# Patient Record
(demographics unavailable — no encounter records)

---

## 2024-11-19 NOTE — IMAGING
[de-identified] : The patient is a well appearing 16 year old male of their stated age. Patient ambulates IN PNEUMATIC CAM BOOT Negative straight leg raise.   Effected Foot and Ankle: RIGHT   Inspection: Erythema: None Ecchymosis: None Abrasions: None Effusion: None Deformity: None Pes [place] Valgus: Negative Pes Cavus: Negative   Palpation: Crepitus: None Medial Malleolus: Nontender Lateral Malleolus: Nontender Syndesmosis: TENDER  Proximal Fibula: Nontender ATFL: TENDER  CFL: Nontender Deltoid: TENDER MILDLY POSTERIORALLY Calcaneus: Nontender Talar Head/Neck: Nontender Peroneal Tendons: Nontender Posterior Tibialis: Nontender Achilles Tendon: Nontender & Intact Anterior Capsule: Nontender Hindfoot: Nontender Midfoot: Nontender Forefoot: Nontender   ROM: Ankle Dorsiflexion: 30 degrees Ankle Plantar Flexion: 45 degrees Motor: Dorsiflexion: 5 out of 5 Plantar Flexion: 5 out of 5 Inversion: 5  out of 5 Eversion: 5 out of 5 EHL: 5 out of 5 FHL: 5 out of 5   Provocative Testing: Anterior Drawer: Negative Syndesmosis Squeeze Test: Negative Gerardo's Test: Negative Midfoot Stability/Rotation: Negative Heel Raise Inversion: Normal Triple Hop: Normal Neurologic Exam: L4-S1 Sensation: Grossly Intact Vascular Exam/Pulses: Dorsalis Pedis: 2+ Posterior Tibialis: 2+ Capillary Refill: <2 Seconds   Other Exams: None Pertinent Contralateral Findings: None   Assessment: The patient is a 16 year old male with Right ankle pain and radiographic and physical exam findings consistent with high ankle sprain The patients condition is acute. Documents/Results Reviewed Today: MRI right ankle Tests/Studies Independently Interpreted Today: MRI right ankle reveals evidence of high ankle sprain Pertinent findings include:  Tender Mild posterior deltoid, Tender ATFL, Tender syndesmosis,  Confounding medical conditions/concerns: None   Plan:  Start physical therapy, HEP, and stretching for high ankle sprain. Recommended the patient obtain a Reparel ankle sleeve and topical Voltaren gel to aid in inflammatory process. The patient will begin use of long Pneumatic CAM boot to ensure stability and proper healing of high ankle sprain. The patient has no weight-bearing restrictions. Discussed appropriate use of OTC anti-inflammatories as needed for pain, inflammation, and discomfort - use as directed and take with food. The patient will follow up in 2 weeks to discuss possible transition into lace-up ankle brace. Until then, the patient is out of gym/sport specific activity. Modify activity as discussed. Tests Ordered: None  Prescription Medications Ordered: Discussed appropriate use of OTC anti-inflammatories and analgesics (including but not limited to Aleve, Advil, Tylenol, Motrin, Ibuprofen, Voltaren gel, etc.) Braces/DME Ordered: None Activity/Work/Sports Status: None Additional Instructions: None Follow-Up: 2 weeks   The patient's current medication management of their orthopedic diagnosis was reviewed today: (1) We discussed a comprehensive treatment plan that included possible pharmaceutical management involving the use of prescription strength medications including but not limited to options such as oral Naprosyn 500mg BID, once daily Meloxicam 15 mg, or 500-650 mg Tylenol versus over the counter oral medications and topical prescription NSAID Pennsaid vs over the counter Voltaren gel.  Based on our extensive discussion, the patient declined prescription medication and will use over the counter Advil, Alleve, Voltaren Gel or Tylenol as directed. (2) There is a moderate risk of morbidity with further treatment, especially from use of prescription strength medications and possible side effects of these medications which include upset stomach with oral medications, skin reactions to topical medications and cardiac/renal issues with long term use. (3) I recommended that the patient follow-up with their medical physician to discuss any significant specific potential issues with long term medication use such as interactions with current medications or with exacerbation of underlying medical comorbidities. (4) The benefits and risks associated with use of injectable, oral or topical, prescription and over the counter anti-inflammatory medications were discussed with the patient. The patient voiced understanding of the risks including but not limited to bleeding, stroke, kidney dysfunction, heart disease, and were referred to the black box warning label for further information.  Selin CANTU attest that this documentation has been prepared under the direction and in the presence of Provider Dr. Glen Silvestre.  The documentation recorded by the scribe accurately reflects the services IDr. Glen, personally performed and the decisions made by me.

## 2024-11-19 NOTE — HISTORY OF PRESENT ILLNESS
[de-identified] : The patient is a 16 year  old left hand dominant male who presents today complaining of right ankle pain  Date of Injury/Onset: 11/18/24 Pain:    At Rest: 7-8/10  With Activity:  9/10  Mechanism of injury: While playing soccer he jumped up, landed and felt his ankle roll  This is NOT a Work Related Injury being treated under Worker's Compensation. This is NOT an athletic injury occurring associated with an interscholastic or organized sports team. Quality of symptoms: lateral ankle pain, posterior ankle pain, shin pain, swelling  Improves with: rest, NSAIDs Worse with: ankle movement, weight bearing  Prior treatment: OC MIKE, SVEN Galo, walking boot  Prior Imaging: XR 11/17/24 and MRI OC 11/18/24 Out of work/sport: Out of sports since 11/17/24 School/Sport/Position/Occupation: [employer] Additional Information: None

## 2024-11-19 NOTE — HISTORY OF PRESENT ILLNESS
[de-identified] : The patient is a 16 year  old left hand dominant male who presents today complaining of right ankle pain  Date of Injury/Onset: 11/18/24 Pain:    At Rest: 7-8/10  With Activity:  9/10  Mechanism of injury: While playing soccer he jumped up, landed and felt his ankle roll  This is NOT a Work Related Injury being treated under Worker's Compensation. This is NOT an athletic injury occurring associated with an interscholastic or organized sports team. Quality of symptoms: lateral ankle pain, posterior ankle pain, shin pain, swelling  Improves with: rest, NSAIDs Worse with: ankle movement, weight bearing  Prior treatment: OC MIKE, SVEN Galo, walking boot  Prior Imaging: XR 11/17/24 and MRI OC 11/18/24 Out of work/sport: Out of sports since 11/17/24 School/Sport/Position/Occupation: [employer] Additional Information: None

## 2024-11-19 NOTE — DATA REVIEWED
[MRI] : MRI [Right] : of the right [Ankle] : ankle [Report was reviewed and noted in the chart] : The report was reviewed and noted in the chart [I independently reviewed and interpreted images and report] : I independently reviewed and interpreted images and report [I reviewed the films/CD and additionally noted] : I reviewed the films/CD and additionally noted [FreeTextEntry1] : MRI right ankle reveals evidence of high ankle sprain

## 2024-11-19 NOTE — IMAGING
[de-identified] : The patient is a well appearing 16 year old male of their stated age. Patient ambulates IN PNEUMATIC CAM BOOT Negative straight leg raise.   Effected Foot and Ankle: RIGHT   Inspection: Erythema: None Ecchymosis: None Abrasions: None Effusion: None Deformity: None Pes [place] Valgus: Negative Pes Cavus: Negative   Palpation: Crepitus: None Medial Malleolus: Nontender Lateral Malleolus: Nontender Syndesmosis: TENDER  Proximal Fibula: Nontender ATFL: TENDER  CFL: Nontender Deltoid: TENDER MILDLY POSTERIORALLY Calcaneus: Nontender Talar Head/Neck: Nontender Peroneal Tendons: Nontender Posterior Tibialis: Nontender Achilles Tendon: Nontender & Intact Anterior Capsule: Nontender Hindfoot: Nontender Midfoot: Nontender Forefoot: Nontender   ROM: Ankle Dorsiflexion: 30 degrees Ankle Plantar Flexion: 45 degrees Motor: Dorsiflexion: 5 out of 5 Plantar Flexion: 5 out of 5 Inversion: 5  out of 5 Eversion: 5 out of 5 EHL: 5 out of 5 FHL: 5 out of 5   Provocative Testing: Anterior Drawer: Negative Syndesmosis Squeeze Test: Negative Gerardo's Test: Negative Midfoot Stability/Rotation: Negative Heel Raise Inversion: Normal Triple Hop: Normal Neurologic Exam: L4-S1 Sensation: Grossly Intact Vascular Exam/Pulses: Dorsalis Pedis: 2+ Posterior Tibialis: 2+ Capillary Refill: <2 Seconds   Other Exams: None Pertinent Contralateral Findings: None   Assessment: The patient is a 16 year old male with Right ankle pain and radiographic and physical exam findings consistent with high ankle sprain The patients condition is acute. Documents/Results Reviewed Today: MRI right ankle Tests/Studies Independently Interpreted Today: MRI right ankle reveals evidence of high ankle sprain Pertinent findings include:  Tender Mild posterior deltoid, Tender ATFL, Tender syndesmosis,  Confounding medical conditions/concerns: None   Plan:  Start physical therapy, HEP, and stretching for high ankle sprain. Recommended the patient obtain a Reparel ankle sleeve and topical Voltaren gel to aid in inflammatory process. The patient will begin use of long Pneumatic CAM boot to ensure stability and proper healing of high ankle sprain. The patient has no weight-bearing restrictions. Discussed appropriate use of OTC anti-inflammatories as needed for pain, inflammation, and discomfort - use as directed and take with food. The patient will follow up in 2 weeks to discuss possible transition into lace-up ankle brace. Until then, the patient is out of gym/sport specific activity. Modify activity as discussed. Tests Ordered: None  Prescription Medications Ordered: Discussed appropriate use of OTC anti-inflammatories and analgesics (including but not limited to Aleve, Advil, Tylenol, Motrin, Ibuprofen, Voltaren gel, etc.) Braces/DME Ordered: None Activity/Work/Sports Status: None Additional Instructions: None Follow-Up: 2 weeks   The patient's current medication management of their orthopedic diagnosis was reviewed today: (1) We discussed a comprehensive treatment plan that included possible pharmaceutical management involving the use of prescription strength medications including but not limited to options such as oral Naprosyn 500mg BID, once daily Meloxicam 15 mg, or 500-650 mg Tylenol versus over the counter oral medications and topical prescription NSAID Pennsaid vs over the counter Voltaren gel.  Based on our extensive discussion, the patient declined prescription medication and will use over the counter Advil, Alleve, Voltaren Gel or Tylenol as directed. (2) There is a moderate risk of morbidity with further treatment, especially from use of prescription strength medications and possible side effects of these medications which include upset stomach with oral medications, skin reactions to topical medications and cardiac/renal issues with long term use. (3) I recommended that the patient follow-up with their medical physician to discuss any significant specific potential issues with long term medication use such as interactions with current medications or with exacerbation of underlying medical comorbidities. (4) The benefits and risks associated with use of injectable, oral or topical, prescription and over the counter anti-inflammatory medications were discussed with the patient. The patient voiced understanding of the risks including but not limited to bleeding, stroke, kidney dysfunction, heart disease, and were referred to the black box warning label for further information.  Selin CANTU attest that this documentation has been prepared under the direction and in the presence of Provider Dr. Glen Silvestre.  The documentation recorded by the scribe accurately reflects the services IDr. Glen, personally performed and the decisions made by me.

## 2024-12-03 NOTE — IMAGING
[de-identified] : The patient is a well appearing 16 year old male of their stated age. Patient ambulates IN LACE-UP ANKLE BRACE.  Negative straight leg raise.   Effected Foot and Ankle: RIGHT   Inspection: Erythema: None Ecchymosis: None Abrasions: None Effusion: None Deformity: None Pes [place] Valgus: Negative Pes Cavus: Negative   Palpation: Crepitus: None Medial Malleolus: Nontender Lateral Malleolus: Nontender Syndesmosis: TENDER, MILDLY  Proximal Fibula: Nontender ATFL: Nontender  CFL: Nontender Deltoid: Nontender  Calcaneus: Nontender Talar Head/Neck: Nontender Peroneal Tendons: Nontender Posterior Tibialis: Nontender Achilles Tendon: Nontender & Intact Anterior Capsule: Nontender Hindfoot: Nontender Midfoot: Nontender Forefoot: Nontender   ROM: Ankle Dorsiflexion: 30 degrees Ankle Plantar Flexion: 45 degrees Motor: Dorsiflexion: 5 out of 5 Plantar Flexion: 5 out of 5 Inversion: 5  out of 5 Eversion: 5 out of 5 EHL: 5 out of 5 FHL: 5 out of 5   Provocative Testing: Anterior Drawer: Negative Syndesmosis Squeeze Test: EQUIVOCAL  Gerardo's Test: Negative Midfoot Stability/Rotation: Negative Heel Raise Inversion: Normal Triple Hop: POSITIVE  Neurologic Exam: L4-S1 Sensation: Grossly Intact Vascular Exam/Pulses: Dorsalis Pedis: 2+ Posterior Tibialis: 2+ Capillary Refill: <2 Seconds   Other Exams: None Pertinent Contralateral Findings: None   Assessment: The patient is a 16 year old male with right ankle pain and radiographic and physical exam findings consistent with high ankle sprain. The patients condition is acute. Documents/Results Reviewed Today: None Tests/Studies Independently Interpreted Today: None Pertinent findings include: Full ankle ROM, mildly tender syndesmosis and ATFL, equivocal syndesmosis squeeze test, +triple hop Confounding medical conditions/concerns: None   Plan:  The patient reports gradual improvement in pain and mechanical symptoms related to high ankle sprain however, has residual syndesmotic tenderness. Continue physical therapy, HEP, and stretching. He will continue use of lace-up ankle support for activities of daily living. Continue PRN use of Naproxen 500mg - use as directed and take with food. The patient will continue to work on ankle stability and proprioception. Remain out of gym/sports, we will monitor on a week to week basis until clearance. Modify activity as discussed.. Tests Ordered: None  Prescription Medications Ordered: Continue Naproxen 500mg Braces/DME Ordered: None Activity/Work/Sports Status: Out of gym/sports Additional Instructions: None Follow-Up: 1 week  The patient's current medication management of their orthopedic diagnosis was reviewed today: (1) The patient will continue with the PRN use of their prescribed anti-inflammatory, Naproxen 500mg (2) There is a moderate risk of morbidity with further treatment, especially from use of prescription strength medications and possible side effects of these medications which include upset stomach with oral medications, skin reactions to topical medications and cardiac/renal issues with long term use. (3) I recommended that the patient follow-up with their medical physician to discuss any significant specific potential issues with long term medication use such as interactions with current medications or with exacerbation of underlying medical comorbidities. (4) The benefits and risks associated with use of injectable, oral or topical, prescription and over the counter anti-inflammatory medications were discussed with the patient. The patient voiced understanding of the risks including but not limited to bleeding, stroke, kidney dysfunction, heart disease, and were referred to the black box warning label for further information.  IAmanda attest that this documentation has been prepared under the direction and in the presence of Provider Dr. Glen Silvestre.   The documentation recorded by the scribe accurately reflects the services IDr. Glen, personally performed and the decisions made by me.

## 2024-12-03 NOTE — HISTORY OF PRESENT ILLNESS
[de-identified] : The patient is a 16 year  old left hand dominant male who presents today complaining of right ankle pain  Date of Injury/Onset: 11/18/24 Pain:    At Rest: 0-1/10  With Activity:  3/10  Mechanism of injury: While playing soccer he jumped up, landed and felt his ankle roll  This is NOT a Work Related Injury being treated under Worker's Compensation. This is NOT an athletic injury occurring associated with an interscholastic or organized sports team. Quality of symptoms: sharp pain with certain movements, dull, soreness during prolonged acticity Improves with: rest, NSAIDs Worse with: lateral movements, pivot/cutting, prolonged walking Treatment/Imaging/Studies Since Last Visit: PT 2x/[facility] at [facility] [place] 	Reports Available For Review Today: none Out of work/sport: Out of sports since 11/17/24 School/Sport/Position/Occupation: [employer] Changes since last visit: patient reports improvement in pain/symptoms, strength since being out of sports and going to PT consistently. Additional Information: None

## 2024-12-03 NOTE — IMAGING
[de-identified] : The patient is a well appearing 16 year old male of their stated age. Patient ambulates IN LACE-UP ANKLE BRACE.  Negative straight leg raise.   Effected Foot and Ankle: RIGHT   Inspection: Erythema: None Ecchymosis: None Abrasions: None Effusion: None Deformity: None Pes [place] Valgus: Negative Pes Cavus: Negative   Palpation: Crepitus: None Medial Malleolus: Nontender Lateral Malleolus: Nontender Syndesmosis: TENDER, MILDLY  Proximal Fibula: Nontender ATFL: Nontender  CFL: Nontender Deltoid: Nontender  Calcaneus: Nontender Talar Head/Neck: Nontender Peroneal Tendons: Nontender Posterior Tibialis: Nontender Achilles Tendon: Nontender & Intact Anterior Capsule: Nontender Hindfoot: Nontender Midfoot: Nontender Forefoot: Nontender   ROM: Ankle Dorsiflexion: 30 degrees Ankle Plantar Flexion: 45 degrees Motor: Dorsiflexion: 5 out of 5 Plantar Flexion: 5 out of 5 Inversion: 5  out of 5 Eversion: 5 out of 5 EHL: 5 out of 5 FHL: 5 out of 5   Provocative Testing: Anterior Drawer: Negative Syndesmosis Squeeze Test: EQUIVOCAL  Gerardo's Test: Negative Midfoot Stability/Rotation: Negative Heel Raise Inversion: Normal Triple Hop: POSITIVE  Neurologic Exam: L4-S1 Sensation: Grossly Intact Vascular Exam/Pulses: Dorsalis Pedis: 2+ Posterior Tibialis: 2+ Capillary Refill: <2 Seconds   Other Exams: None Pertinent Contralateral Findings: None   Assessment: The patient is a 16 year old male with right ankle pain and radiographic and physical exam findings consistent with high ankle sprain. The patients condition is acute. Documents/Results Reviewed Today: None Tests/Studies Independently Interpreted Today: None Pertinent findings include: Full ankle ROM, mildly tender syndesmosis and ATFL, equivocal syndesmosis squeeze test, +triple hop Confounding medical conditions/concerns: None   Plan:  The patient reports gradual improvement in pain and mechanical symptoms related to high ankle sprain however, has residual syndesmotic tenderness. Continue physical therapy, HEP, and stretching. He will continue use of lace-up ankle support for activities of daily living. Continue PRN use of Naproxen 500mg - use as directed and take with food. The patient will continue to work on ankle stability and proprioception. Remain out of gym/sports, we will monitor on a week to week basis until clearance. Modify activity as discussed.. Tests Ordered: None  Prescription Medications Ordered: Continue Naproxen 500mg Braces/DME Ordered: None Activity/Work/Sports Status: Out of gym/sports Additional Instructions: None Follow-Up: 1 week  The patient's current medication management of their orthopedic diagnosis was reviewed today: (1) The patient will continue with the PRN use of their prescribed anti-inflammatory, Naproxen 500mg (2) There is a moderate risk of morbidity with further treatment, especially from use of prescription strength medications and possible side effects of these medications which include upset stomach with oral medications, skin reactions to topical medications and cardiac/renal issues with long term use. (3) I recommended that the patient follow-up with their medical physician to discuss any significant specific potential issues with long term medication use such as interactions with current medications or with exacerbation of underlying medical comorbidities. (4) The benefits and risks associated with use of injectable, oral or topical, prescription and over the counter anti-inflammatory medications were discussed with the patient. The patient voiced understanding of the risks including but not limited to bleeding, stroke, kidney dysfunction, heart disease, and were referred to the black box warning label for further information.  IAmanda attest that this documentation has been prepared under the direction and in the presence of Provider Dr. Glen Silvestre.   The documentation recorded by the scribe accurately reflects the services IDr. Glen, personally performed and the decisions made by me.

## 2024-12-03 NOTE — HISTORY OF PRESENT ILLNESS
[de-identified] : The patient is a 16 year  old left hand dominant male who presents today complaining of right ankle pain  Date of Injury/Onset: 11/18/24 Pain:    At Rest: 0-1/10  With Activity:  3/10  Mechanism of injury: While playing soccer he jumped up, landed and felt his ankle roll  This is NOT a Work Related Injury being treated under Worker's Compensation. This is NOT an athletic injury occurring associated with an interscholastic or organized sports team. Quality of symptoms: sharp pain with certain movements, dull, soreness during prolonged acticity Improves with: rest, NSAIDs Worse with: lateral movements, pivot/cutting, prolonged walking Treatment/Imaging/Studies Since Last Visit: PT 2x/[facility] at [facility] [place] 	Reports Available For Review Today: none Out of work/sport: Out of sports since 11/17/24 School/Sport/Position/Occupation: [employer] Changes since last visit: patient reports improvement in pain/symptoms, strength since being out of sports and going to PT consistently. Additional Information: None

## 2024-12-10 NOTE — HISTORY OF PRESENT ILLNESS
[de-identified] : The patient is a 16 year  old left hand dominant male who presents today complaining of right ankle pain  Date of Injury/Onset: 11/18/24 Pain:    At Rest: 0-1/10  With Activity:  1-2/10  Mechanism of injury: While playing soccer he jumped up, landed and felt his ankle roll  This is NOT a Work Related Injury being treated under Worker's Compensation. This is NOT an athletic injury occurring associated with an interscholastic or organized sports team. Quality of symptoms: sharp pain with certain movements, dull, soreness during prolonged acticity Improves with: rest, NSAIDs Worse with: lateral movements, pivot/cutting, prolonged walking Treatment/Imaging/Studies Since Last Visit: PT 2x/week at [facility] [place] 	Reports Available For Review Today: none Out of work/sport: Out of sports since 11/17/24 School/Sport/Position/Occupation: [employer] Changes since last visit: Beginning to start ramping into straight line activity and strengthening, going to PT  Additional Information: None

## 2024-12-10 NOTE — HISTORY OF PRESENT ILLNESS
[de-identified] : The patient is a 16 year  old left hand dominant male who presents today complaining of right ankle pain  Date of Injury/Onset: 11/18/24 Pain:    At Rest: 0-1/10  With Activity:  1-2/10  Mechanism of injury: While playing soccer he jumped up, landed and felt his ankle roll  This is NOT a Work Related Injury being treated under Worker's Compensation. This is NOT an athletic injury occurring associated with an interscholastic or organized sports team. Quality of symptoms: sharp pain with certain movements, dull, soreness during prolonged acticity Improves with: rest, NSAIDs Worse with: lateral movements, pivot/cutting, prolonged walking Treatment/Imaging/Studies Since Last Visit: PT 2x/week at [facility] [place] 	Reports Available For Review Today: none Out of work/sport: Out of sports since 11/17/24 School/Sport/Position/Occupation: [employer] Changes since last visit: Beginning to start ramping into straight line activity and strengthening, going to PT  Additional Information: None

## 2024-12-10 NOTE — IMAGING
[de-identified] : The patient is a well appearing 16 year old male of their stated age. Patient ambulates IN LACE-UP ANKLE BRACE.  Negative straight leg raise.   Effected Foot and Ankle: RIGHT   Inspection: Erythema: None Ecchymosis: None Abrasions: None Effusion: None Deformity: None Pes Gilmer Valgus: Negative Pes Cavus: Negative   Palpation: Crepitus: None Medial Malleolus: Nontender Lateral Malleolus: Nontender Syndesmosis: TENDER, MILDLY  Proximal Fibula: Nontender ATFL: Nontender  CFL: Nontender Deltoid: Nontender  Calcaneus: Nontender Talar Head/Neck: Nontender Peroneal Tendons: Nontender Posterior Tibialis: Nontender Achilles Tendon: Nontender & Intact Anterior Capsule: Nontender Hindfoot: Nontender Midfoot: Nontender Forefoot: Nontender   ROM: Ankle Dorsiflexion: 30 degrees Ankle Plantar Flexion: 45 degrees Motor: Dorsiflexion: 5 out of 5 Plantar Flexion: 5 out of 5 Inversion: 5  out of 5 Eversion: 5 out of 5 EHL: 5 out of 5 FHL: 5 out of 5   Provocative Testing: Anterior Drawer: Negative Syndesmosis Squeeze Test: EQUIVOCAL  Gerardo's Test: Negative Midfoot Stability/Rotation: Negative Heel Raise Inversion: Normal Triple Hop: POSITIVE  Neurologic Exam: L4-S1 Sensation: Grossly Intact Vascular Exam/Pulses: Dorsalis Pedis: 2+ Posterior Tibialis: 2+ Capillary Refill: <2 Seconds   Other Exams: None Pertinent Contralateral Findings: None   Assessment: The patient is a 16 year old male with right ankle pain and radiographic and physical exam findings consistent with high ankle sprain. The patients condition is acute. Documents/Results Reviewed Today: None Tests/Studies Independently Interpreted Today: None Pertinent findings include: Full ankle ROM, mildly tender syndesmosis and ATFL, equivocal syndesmosis squeeze test, +triple hop Confounding medical conditions/concerns: None   Plan: The patient reports gradual improvement in pain and mechanical symptoms related to high ankle sprain, however, has residual syndesmotic tenderness. Finish out physical therapy, HEP, and stretching. Continue PRN use of Naproxen 500mg - use as directed and take with food. The patient will continue to work on ankle stability and proprioception. The patient is cleared for gym and sports in lace up ankle brace. Modify activity as discussed. The patient will follow up on a PRN basis unless new symptoms arise. Tests Ordered: None  Prescription Medications Ordered: Continue Naproxen 500mg Braces/DME Ordered: None Activity/Work/Sports Status: cleared in lace up ankle brace  Additional Instructions: None Follow-Up: PRN  The patient's current medication management of their orthopedic diagnosis was reviewed today: (1) The patient will continue with the PRN use of their prescribed anti-inflammatory, Naproxen 500mg (2) There is a moderate risk of morbidity with further treatment, especially from use of prescription strength medications and possible side effects of these medications which include upset stomach with oral medications, skin reactions to topical medications and cardiac/renal issues with long term use. (3) I recommended that the patient follow-up with their medical physician to discuss any significant specific potential issues with long term medication use such as interactions with current medications or with exacerbation of underlying medical comorbidities. (4) The benefits and risks associated with use of injectable, oral or topical, prescription and over the counter anti-inflammatory medications were discussed with the patient. The patient voiced understanding of the risks including but not limited to bleeding, stroke, kidney dysfunction, heart disease, and were referred to the black box warning label for further information.  ISelin attest that this documentation has been prepared under the direction and in the presence of Provider Dr. Glen Silvestre.   The documentation recorded by the scribe accurately reflects the service PEPE Crawford PA-C personally performed and the decisions made by me. The patient was seen by me under the direct supervision of Dr. Glen Silvestre

## 2024-12-10 NOTE — IMAGING
[de-identified] : The patient is a well appearing 16 year old male of their stated age. Patient ambulates IN LACE-UP ANKLE BRACE.  Negative straight leg raise.   Effected Foot and Ankle: RIGHT   Inspection: Erythema: None Ecchymosis: None Abrasions: None Effusion: None Deformity: None Pes Keystone Valgus: Negative Pes Cavus: Negative   Palpation: Crepitus: None Medial Malleolus: Nontender Lateral Malleolus: Nontender Syndesmosis: TENDER, MILDLY  Proximal Fibula: Nontender ATFL: Nontender  CFL: Nontender Deltoid: Nontender  Calcaneus: Nontender Talar Head/Neck: Nontender Peroneal Tendons: Nontender Posterior Tibialis: Nontender Achilles Tendon: Nontender & Intact Anterior Capsule: Nontender Hindfoot: Nontender Midfoot: Nontender Forefoot: Nontender   ROM: Ankle Dorsiflexion: 30 degrees Ankle Plantar Flexion: 45 degrees Motor: Dorsiflexion: 5 out of 5 Plantar Flexion: 5 out of 5 Inversion: 5  out of 5 Eversion: 5 out of 5 EHL: 5 out of 5 FHL: 5 out of 5   Provocative Testing: Anterior Drawer: Negative Syndesmosis Squeeze Test: EQUIVOCAL  Gerardo's Test: Negative Midfoot Stability/Rotation: Negative Heel Raise Inversion: Normal Triple Hop: POSITIVE  Neurologic Exam: L4-S1 Sensation: Grossly Intact Vascular Exam/Pulses: Dorsalis Pedis: 2+ Posterior Tibialis: 2+ Capillary Refill: <2 Seconds   Other Exams: None Pertinent Contralateral Findings: None   Assessment: The patient is a 16 year old male with right ankle pain and radiographic and physical exam findings consistent with high ankle sprain. The patients condition is acute. Documents/Results Reviewed Today: None Tests/Studies Independently Interpreted Today: None Pertinent findings include: Full ankle ROM, mildly tender syndesmosis and ATFL, equivocal syndesmosis squeeze test, +triple hop Confounding medical conditions/concerns: None   Plan: The patient reports gradual improvement in pain and mechanical symptoms related to high ankle sprain, however, has residual syndesmotic tenderness. Finish out physical therapy, HEP, and stretching. Continue PRN use of Naproxen 500mg - use as directed and take with food. The patient will continue to work on ankle stability and proprioception. The patient is cleared for gym and sports in lace up ankle brace. Modify activity as discussed. The patient will follow up on a PRN basis unless new symptoms arise. Tests Ordered: None  Prescription Medications Ordered: Continue Naproxen 500mg Braces/DME Ordered: None Activity/Work/Sports Status: cleared in lace up ankle brace  Additional Instructions: None Follow-Up: PRN  The patient's current medication management of their orthopedic diagnosis was reviewed today: (1) The patient will continue with the PRN use of their prescribed anti-inflammatory, Naproxen 500mg (2) There is a moderate risk of morbidity with further treatment, especially from use of prescription strength medications and possible side effects of these medications which include upset stomach with oral medications, skin reactions to topical medications and cardiac/renal issues with long term use. (3) I recommended that the patient follow-up with their medical physician to discuss any significant specific potential issues with long term medication use such as interactions with current medications or with exacerbation of underlying medical comorbidities. (4) The benefits and risks associated with use of injectable, oral or topical, prescription and over the counter anti-inflammatory medications were discussed with the patient. The patient voiced understanding of the risks including but not limited to bleeding, stroke, kidney dysfunction, heart disease, and were referred to the black box warning label for further information.  ISelin attest that this documentation has been prepared under the direction and in the presence of Provider Dr. Glen Silvestre.   The documentation recorded by the scribe accurately reflects the service PEPE Crawford PA-C personally performed and the decisions made by me. The patient was seen by me under the direct supervision of Dr. Glen Silvestre